# Patient Record
(demographics unavailable — no encounter records)

---

## 2024-12-05 NOTE — REVIEW OF SYSTEMS
[Abdominal Pain] : abdominal pain [Vomiting] : no vomiting [Constipation] : constipation [Diarrhea] : no diarrhea [Heartburn] : no heartburn [Melena (black stool)] : no melena [Bleeding] : no bleeding [Fecal Incontinence (soiling)] : no fecal incontinence [Bloating (gassiness)] : bloating [Negative] : Heme/Lymph

## 2024-12-05 NOTE — PHYSICAL EXAM
[Alert] : alert [Healthy Appearing] : healthy appearing [Sclera] : the sclera and conjunctiva were normal [Hearing Threshold Finger Rub Not [place]] : hearing was normal [Normal Appearance] : the appearance of the neck was normal [No Respiratory Distress] : no respiratory distress [Auscultation Breath Sounds / Voice Sounds] : lungs were clear to auscultation bilaterally [Heart Rate And Rhythm] : heart rate was normal and rhythm regular [Bowel Sounds] : normal bowel sounds [Abdomen Tenderness] : non-tender [Abdomen Soft] : soft [Abnormal Walk] : normal gait [Normal Color / Pigmentation] : normal skin color and pigmentation [Oriented To Time, Place, And Person] : oriented to person, place, and time

## 2024-12-05 NOTE — ASSESSMENT
[FreeTextEntry1] : Plan: 65 year old male PMH celiac disease presenting for new onset abdominal bloating and discomfort associated with constipation. Discussed importance of dietary fiber to help regulate bowels, bloating likely associated. Can order abdominal US to ensure no other etiology, would also recommend colonoscopy. Risks versus benefits as well as instructions reviewed, pt agrees to planned procedure. If no relief with fiber regimen, can escalate to miralax regimen. Pt agreeable, all questions answered.

## 2024-12-05 NOTE — HISTORY OF PRESENT ILLNESS
[FreeTextEntry1] : Lul Silva is a 65 year old male presenting today for evaluation of abdominal discomfort and bloating. Notes for the last few weeks it has been happening, admittedly also struggling with constipation only having bowel movements every few days. Denies any bleeding associated such as melena or hematochezia. recently discontinued ozempic since he was not losing weight any longer. Last colonoscopy was 7 years ago. Denies nausea, vomiting, dysphagia, unintentional weight loss.

## 2024-12-10 NOTE — REASON FOR VISIT
[Follow-Up] : a follow-up visit [FreeTextEntry1] : via video call - asthma, cough, GERD, obesity, RAKAN off CPAP, sicca and SOB
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
## 2024-12-10 NOTE — ADDENDUM
[FreeTextEntry1] : Documented by Jason Wang acting as a scribe for Dr. Malik Heard on 12/10/2024. All medical record entries made by the Scribe were at my, Dr. Malik Heard's, direction and personally dictated by me on 12/10/2024. I have reviewed the chart and agree that the record accurately reflects my personal performance of the history, physical exam, assessment and plan. I have also personally directed, reviewed, and agree with the discharge instructions.

## 2024-12-10 NOTE — ASSESSMENT
[FreeTextEntry1] : Mr. Silva is a 65-year-old male with a history of asthma, allergy, GERD, OSAS, obesity, DM, HLD. s/p asthmatic bronchitis due to COVID-19 12/2021 - (resolved) - RAKAN-untreated, (recalled BiPAP)- improved with weight loss, #1 issue is neuropathy, #2 stress, #3 bowel issues   His shortness of breath is multifactorial due to: -overweight/out of shape -poor breathing mechanics -asthma -GERD -CAD  problem 1: Asthma (NC) - controlled (NC) -continue Levalbuterol via Handheld nebulizer, Q6H 0.63 -continue Trelegy 200 1 inhalation qD -continue Singulair 10 mg QD at bedtime -continue Ventolin PRN before exercise -Inhaler technique reviewed as well as oral hygiene techniques reviewed with patient. Avoidance of cold air, extremes of temperature, rescue inhaler should be used before exercise. Order of medication reviewed with patient. Recommended use of a cool mist humidifier in the bedroom. Instructed to gargle and spit after inhaler use. -Asthma is believed to be caused by inherited (genetic) and environmental factor, but its exact cause is unknown. Asthma may be triggered by allergens, lung infections, or irritants in the air. Asthma triggers are different for each person  problem 1A: asthmatic bronchitis COVID-19 12/2021 - 1/2022 (resolved) -s/p Ana [place] Cold and Flu -s/p Dexamethasone 6mg x 10D -Quarantine 10D -s/p COVID-19 vaccine x2 4/2021  problem 2: overweight/out of shape -on Ozempic  -recommended consultation with Dr. Zeferino Benjamin -Recommended Jairo Biggs's 10-day detox diet and book. -Weight loss, exercise, and diet control were discussed and are highly encouraged. Treatment options were given such as, aqua therapy, and contacting a nutritionist. Recommended to use the elliptical, stationary bike, less use of treadmill. Obesity is associated with worsening asthma, shortness of breath, and potential for cardiac disease, diabetes, and other underlying medical conditions.  problem 3: mechanics of breathing -Recommended Gorge Adamson and Fili breathing techniques -Proper breathing techniques were reviewed with an emphasis of exhalation. Patient instructed to breath in for 1 second and out for four seconds. Patient was encouraged to not talk while walking.  problem 4: cardiology -continue to follow-up with a cardiologist (Dr. Elian Radford)  Problem 5: Severe OSAS (NC with Rx) -s/p home sleep study (virtuox)  -after receiving the results, pt will get an empiric machine while awaiting second study -redo split night sleep study, second half with BiPAP- repeat split night sleep study -AHI is 90, he is to continue using BiPAP - repeat Bilevel sleep study -s/p split night sleep study 4/2023- AHI 0.8 at CPAP PAP level of 8cm H2O, AHI 29 without CPAP -repeat follow-up home sleep study @ goal weight of 190 lbs -Discussed the risks/associations with coronary artery disease, atrial fibrillation, arrhythmia, memory loss, issues with concentration, stroke risk, hypertension, nocturia, chronic reflux/Amin's esophagus some but not all inclusive. Treatment options discussed including CPAP/BiPAP machine, oral appliance, ProVent therapy, Oxy-Aid by Respitec, new technologies, or positional sleep.  Problem 6: GERD (controlled) -continue Pepcid 40 mg QHS -Omeprazole 40 mg QD at breakfast, recommended FD [place]  -Things to avoid including overeating, spicy foods, tight clothing, eating within three hours of bed, this list is not all inclusive. -For treatment of reflux, possible options discussed including diet control, H2 blockers, PPIs, as well as coating motility agents discussed as treatment options. Timing of meals and proximity of last meal to sleep were discussed. If symptoms persist, a formal gastrointestinal evaluation is needed. -Rule of 2's: Avoid eating too late, too fast, too much, too spicy or within two hours of bedtime  Problem 7: Allergic rhinitis (quiet) -continue Olopatadine 0.6% 1 sniff each nostril up to twice daily -continue Atrovent 0.6% nasal spray 1 inhalation each nostril BID -continue Xlear PRN -recommended to use Nasogel -Environmental measures for allergies were encouraged including mattress and pillow cover, air purifier, and environmental controls.  Problem 8: SICCA -Mouth Kote spray PRN  Problem 9: Health Maintenance -Neuropathy: recommended Berberine Synergy OTC supplement and NeuroRenew   -recommended evaluation with Dr. Brenda for tongue lesion -recommended Neurologic evaluation -s/p flu shot 2023 -recommended Boron 6 mg qDay for erectile dysfunction -recommended strep pneumonia vaccines: Prevnar-13 vaccine, follow by Pneumo vaccine 23 one year following -recommended early intervention for URIs -recommended regular osteoporosis evaluations -recommended early dermatological evaluations -recommended after the age of 50 to the age of 70, colonoscopy every 5 years  Follow up in 6 months Patient is encouraged to call with any changes, concerns or questions.

## 2025-05-13 NOTE — ASSESSMENT
[FreeTextEntry1] : Mr. Silva is a 65-year-old male with a history of asthma, allergy, GERD, OSAS, obesity, DM, HLD. s/p asthmatic bronchitis due to COVID-19 12/2021 - (resolved) - RAKAN-untreated, (recalled BiPAP)- improved with weight loss, #1 issue is neuropathy, #2 stress #3 pre-op clearance for potential surgery (neurosurgery) 2025  ******************************PRE-OP CLEARANCE FOR NEUROSURGERY 2025************************************** -at this point in time there are no absolute pulmonary contraindications to go forward with the planned procedure -at the time of surgery he should have optimal pain control, incentive spirometry, early ambulation, DVT and GI prophylaxis   His shortness of breath is multifactorial due to: -overweight/out of shape -poor breathing mechanics -asthma -GERD -CAD  problem 1: Asthma (NC) - controlled (NC) -continue Levalbuterol 0.63% via Handheld nebulizer -continue Trelegy 200 1 inhalation QD -continue Singulair 10 mg QD at bedtime -continue Ventolin PRN before exercise -Inhaler technique reviewed as well as oral hygiene techniques reviewed with patient. Avoidance of cold air, extremes of temperature, rescue inhaler should be used before exercise. Order of medication reviewed with patient. Recommended use of a cool mist humidifier in the bedroom. Instructed to gargle and spit after inhaler use. -Asthma is believed to be caused by inherited (genetic) and environmental factor, but its exact cause is unknown. Asthma may be triggered by allergens, lung infections, or irritants in the air. Asthma triggers are different for each person  problem 1A: asthmatic bronchitis COVID-19 12/2021 - 1/2022 (resolved) -s/p Ana [place] Cold and Flu -s/p Dexamethasone 6mg x 10D -Quarantine 10D -s/p COVID-19 vaccine x2 4/2021  Problem 1B: pre-op clearance for neurosurgery 2025 -at this point in time there are no absolute pulmonary contraindications to go forward with the planned procedure -at the time of surgery he should have optimal pain control, incentive spirometry, early ambulation, DVT and GI prophylaxis   problem 2: overweight/out of shape -s/p Ozempic- Mounjaro 2.5 mg in place -recommended consultation with Dr. Zeferino Benjamin -Recommended Jairo Dian's 10-day detox diet and book. -Weight loss, exercise, and diet control were discussed and are highly encouraged. Treatment options were given such as, aqua therapy, and contacting a nutritionist. Recommended to use the elliptical, stationary bike, less use of treadmill. Obesity is associated with worsening asthma, shortness of breath, and potential for cardiac disease, diabetes, and other underlying medical conditions.  problem 3: mechanics of breathing -Recommended Wioscar Adamson and Buteydusty breathing techniques -Proper breathing techniques were reviewed with an emphasis of exhalation. Patient instructed to breath in for 1 second and out for four seconds. Patient was encouraged to not talk while walking.  problem 4: cardiology -continue to follow up with a cardiologist (Dr. Elian Radford)  Problem 5: Severe OSAS (NC with Rx) -s/p home sleep study (virtuox)  -after receiving the results, pt will get an empiric machine while awaiting second study -redo split night sleep study, second half with BiPAP- repeat split night sleep study -AHI is 90, he is to continue using BiPAP - repeat Bilevel sleep study -s/p split night sleep study 4/2023- AHI 0.8 at CPAP PAP level of 8cm H2O, AHI 29 without CPAP -repeat follow-up home sleep study @ goal weight of 190 lbs -Discussed the risks/associations with coronary artery disease, atrial fibrillation, arrhythmia, memory loss, issues with concentration, stroke risk, hypertension, nocturia, chronic reflux/Amin's esophagus some but not all inclusive. Treatment options discussed including CPAP/BiPAP machine, oral appliance, ProVent therapy, Oxy-Aid by Respitec, new technologies, or positional sleep.  Problem 6: GERD (controlled) -continue Pepcid 40 mg QHS -Omeprazole 40 mg QD at breakfast, recommended FDGard  -Things to avoid including overeating, spicy foods, tight clothing, eating within three hours of bed, this list is not all inclusive. -For treatment of reflux, possible options discussed including diet control, H2 blockers, PPIs, as well as coating motility agents discussed as treatment options. Timing of meals and proximity of last meal to sleep were discussed. If symptoms persist, a formal gastrointestinal evaluation is needed. -Rule of 2's: Avoid eating too late, too fast, too much, too spicy or within two hours of bedtime  Problem 7: Allergic rhinitis (quiet) -continue Xlear PRN -continue Olopatadine 0.6% 1 sniff each nostril up to twice daily -continue Atrovent 0.06% nasal spray 1 inhalation each nostril BID -recommended to use Nasogel -Environmental measures for allergies were encouraged including mattress and pillow cover, air purifier, and environmental controls.  Problem 8: SICCA -Mouth Kote spray PRN  Problem 9: Health Maintenance -Neuropathy: recommended Berberine Synergy OTC supplement and NeuroRenew   -recommended evaluation with Dr. Gutierrez for tongue lesion -recommended Neurologic evaluation (Dr. Jimenez [place]) -s/p flu shot 2024 -recommended Boron 6 mg QD for erectile dysfunction -recommended strep pneumonia vaccines: Prevnar-20 vaccine after the age of 65 -recommended early intervention for URIs -recommended regular osteoporosis evaluations -recommended early dermatological evaluations -recommended after the age of 50 to the age of 70, colonoscopy every 5 years  Follow up in 4 months Patient is encouraged to call with any changes, concerns or questions.
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
## 2025-05-13 NOTE — ADDENDUM
[FreeTextEntry1] : Documented by Maggy Pan acting as a scribe for Dr. Malik Heard on 05/13/2025. All medical record entries made by the Scribe were at my, Dr. Malik Heard's, direction and personally dictated by me on 05/13/2025. I have reviewed the chart and agree that the record accurately reflects my personal performance of the history, physical exam, assessment and plan. I have also personally directed, reviewed, and agree with the discharge instructions.

## 2025-05-13 NOTE — PROCEDURE
[FreeTextEntry1] : Full PFT reveals normal flows; FEV1 was 3.18L which is 99% of predicted; normal lung volumes; normal diffusion at 24.33, which is 92% of predicted; flattened inspiratory limb. PFTs were performed to evaluate for SOB, asthma  FENO was 15; a normal value being less than 25 Fractional exhaled nitric oxide (FENO) is regarded as a simple, noninvasive method for assessing eosinophilic airway inflammation. Produced by a variety of cells within the lung, nitric oxide (NO) concentrations are generally low in healthy individuals. However, high concentrations of NO appear to be involved in nonspecific host defense mechanisms and chronic inflammatory diseases such as asthma. The American Thoracic Society (ATS) therefore has recommended using FENO to aid in the diagnosis and monitoring of eosinophilic airway inflammation and asthma, and for identifying steroid responsive individuals whose chronic respiratory symptoms may be caused by airway inflammation.

## 2025-05-13 NOTE — REASON FOR VISIT
[Follow-Up] : a follow-up visit [[id number]] : pre-op clearance for neurosurgery 2025, asthma, cough, GERD, obesity, RAKAN off CPAP, sicca and SOB